# Patient Record
Sex: FEMALE | Race: BLACK OR AFRICAN AMERICAN
[De-identification: names, ages, dates, MRNs, and addresses within clinical notes are randomized per-mention and may not be internally consistent; named-entity substitution may affect disease eponyms.]

---

## 2018-10-12 ENCOUNTER — HOSPITAL ENCOUNTER (EMERGENCY)
Dept: HOSPITAL 72 - EMR | Age: 23
Discharge: HOME | End: 2018-10-12
Payer: COMMERCIAL

## 2018-10-12 VITALS — DIASTOLIC BLOOD PRESSURE: 72 MMHG | SYSTOLIC BLOOD PRESSURE: 111 MMHG

## 2018-10-12 VITALS — WEIGHT: 178 LBS | BODY MASS INDEX: 25.48 KG/M2 | HEIGHT: 70 IN

## 2018-10-12 DIAGNOSIS — M54.9: Primary | ICD-10-CM

## 2018-10-12 DIAGNOSIS — R10.9: ICD-10-CM

## 2018-10-12 LAB
ADD MANUAL DIFF: NO
ALBUMIN SERPL-MCNC: 2.9 G/DL (ref 3.4–5)
ALBUMIN/GLOB SERPL: 0.7 {RATIO} (ref 1–2.7)
ALP SERPL-CCNC: 90 U/L (ref 46–116)
ALT SERPL-CCNC: 29 U/L (ref 12–78)
ANION GAP SERPL CALC-SCNC: 10 MMOL/L (ref 5–15)
APPEARANCE UR: CLEAR
APTT PPP: (no result) S
AST SERPL-CCNC: 21 U/L (ref 15–37)
BASOPHILS NFR BLD AUTO: 1.1 % (ref 0–2)
BILIRUB SERPL-MCNC: 0.4 MG/DL (ref 0.2–1)
BUN SERPL-MCNC: 10 MG/DL (ref 7–18)
CALCIUM SERPL-MCNC: 9.6 MG/DL (ref 8.5–10.1)
CHLORIDE SERPL-SCNC: 105 MMOL/L (ref 98–107)
CO2 SERPL-SCNC: 22 MMOL/L (ref 21–32)
CREAT SERPL-MCNC: 0.6 MG/DL (ref 0.55–1.3)
EOSINOPHIL NFR BLD AUTO: 2.8 % (ref 0–3)
ERYTHROCYTE [DISTWIDTH] IN BLOOD BY AUTOMATED COUNT: 12.4 % (ref 11.6–14.8)
GLOBULIN SER-MCNC: 4 G/DL
GLUCOSE UR STRIP-MCNC: NEGATIVE MG/DL
HCT VFR BLD CALC: 34.1 % (ref 37–47)
HGB BLD-MCNC: 11.5 G/DL (ref 12–16)
KETONES UR QL STRIP: NEGATIVE
LEUKOCYTE ESTERASE UR QL STRIP: NEGATIVE
LYMPHOCYTES NFR BLD AUTO: 26.3 % (ref 20–45)
MCV RBC AUTO: 79 FL (ref 80–99)
MONOCYTES NFR BLD AUTO: 6 % (ref 1–10)
NEUTROPHILS NFR BLD AUTO: 63.8 % (ref 45–75)
NITRITE UR QL STRIP: NEGATIVE
PH UR STRIP: 7 [PH] (ref 4.5–8)
PLATELET # BLD: 239 K/UL (ref 150–450)
POTASSIUM SERPL-SCNC: 3.7 MMOL/L (ref 3.5–5.1)
PROT UR QL STRIP: NEGATIVE
RBC # BLD AUTO: 4.34 M/UL (ref 4.2–5.4)
SODIUM SERPL-SCNC: 137 MMOL/L (ref 136–145)
SP GR UR STRIP: 1.01 (ref 1–1.03)
UROBILINOGEN UR-MCNC: NORMAL MG/DL (ref 0–1)
WBC # BLD AUTO: 6.7 K/UL (ref 4.8–10.8)

## 2018-10-12 PROCEDURE — 99283 EMERGENCY DEPT VISIT LOW MDM: CPT

## 2018-10-12 PROCEDURE — 85025 COMPLETE CBC W/AUTO DIFF WBC: CPT

## 2018-10-12 PROCEDURE — 80053 COMPREHEN METABOLIC PANEL: CPT

## 2018-10-12 PROCEDURE — 83690 ASSAY OF LIPASE: CPT

## 2018-10-12 PROCEDURE — 36415 COLL VENOUS BLD VENIPUNCTURE: CPT

## 2018-10-12 PROCEDURE — 81003 URINALYSIS AUTO W/O SCOPE: CPT

## 2018-10-12 NOTE — EMERGENCY ROOM REPORT
History of Present Illness


General


Chief Complaint:  Back Pain-No Injury


Source:  Patient





Present Illness


HPI


Patient present with complaints of right flank pain ongoing for the past 7 days


Patient reports that she saw her OB physician several days ago and was told 

that her test results would take up to a week as a discomfort persisted she 

presents to the ER denies any chest pain or shortness of breath denies any 

vomiting or diarrhea pain is a pressure and sharp pain


And eyes any lower abdominal pain or cramping denies any vaginal bleeding or 

spotting denies any dysuria


Patient felt that she might be dehydrated and was drinking water and taking 

cranberry juice





Along with Tylenol which was minimally improving her symptoms


Allergies:  


Coded Allergies:  


     No Known Allergies (Unverified , 10/12/18)





Patient History


Past Medical History:  see triage record


Pertinent Family History:  none


Pregnant Now:  Yes - 25 weeks


Reviewed Nursing Documentation:  PMH: Agreed; PSxH: Agreed





Nursing Documentation-PMH


Past Medical History:  No Stated History





Review of Systems


All Other Systems:  negative except mentioned in HPI





Physical Exam





Vital Signs








  Date Time  Temp Pulse Resp B/P (MAP) Pulse Ox O2 Delivery O2 Flow Rate FiO2


 


10/12/18 17:21 98.3 95 18 111/68 96 Room Air  





 98.2       








Sp02 EP Interpretation:  reviewed, normal


General Appearance:  well appearing, no apparent distress


Head:  normocephalic, atraumatic


Eyes:  bilateral eye PERRL, bilateral eye EOMI


ENT:  hearing grossly normal, normal pharynx


Neck:  supple


Respiratory:  lungs clear, no respiratory distress, no retraction, no accessory 

muscle use


Cardiovascular #1:  regular rate, rhythm, no edema


Gastrointestinal:  non tender - Subjectively points to the right flank area for 

discomfort, some upper quadrant pain as well,  abdomen is palpable


Musculoskeletal:  normal inspection


Neurologic:  alert, oriented x3


Skin:  normal color, no rash





Medical Decision Making


Diagnostic Impression:  


 Primary Impression:  


 Back pain


 Additional Impression:  


 Flank pain


ER Course


Multiple differentials entertained


Patient does point mainly to the right mid back area also some flank region





Extensive blood work is initiated urine sample is clear


Making pyelonephritis less likely





Emergency room bedside ultrasound is obtained with appropriate visualization of 

the fetal heartbeat





Patient reports that the baby has been moving denies any abdominal cramping or 

pain denies any contraction type discomfort





Liver function tests all within normal limits as well I did not feel patient 

met any other emergency criteria for imaging and she is stable for close 

outpatient follow-up with her specialist





Labs








Test


  10/12/18


17:46 10/12/18


17:55


 


Urine Color Pale yellow  


 


Urine Appearance Clear  


 


Urine pH 7 (4.5-8.0)  


 


Urine Specific Gravity


  1.015


(1.005-1.035) 


 


 


Urine Protein


  Negative


(NEGATIVE) 


 


 


Urine Glucose (UA)


  Negative


(NEGATIVE) 


 


 


Urine Ketones


  Negative


(NEGATIVE) 


 


 


Urine Blood


  Negative


(NEGATIVE) 


 


 


Urine Nitrite


  Negative


(NEGATIVE) 


 


 


Urine Bilirubin


  Negative


(NEGATIVE) 


 


 


Urine Urobilinogen


  Normal MG/DL


(0.0-1.0) 


 


 


Urine Leukocyte Esterase


  Negative


(NEGATIVE) 


 


 


White Blood Count


  


  6.7 K/UL


(4.8-10.8)


 


Red Blood Count


  


  4.34 M/UL


(4.20-5.40)


 


Hemoglobin


  


  11.5 G/DL


(12.0-16.0)


 


Hematocrit


  


  34.1 %


(37.0-47.0)


 


Mean Corpuscular Volume  79 FL (80-99) 


 


Mean Corpuscular Hemoglobin


  


  26.5 PG


(27.0-31.0)


 


Mean Corpuscular Hemoglobin


Concent 


  33.7 G/DL


(32.0-36.0)


 


Red Cell Distribution Width


  


  12.4 %


(11.6-14.8)


 


Platelet Count


  


  239 K/UL


(150-450)


 


Mean Platelet Volume


  


  8.4 FL


(6.5-10.1)


 


Neutrophils (%) (Auto)


  


  63.8 %


(45.0-75.0)


 


Lymphocytes (%) (Auto)


  


  26.3 %


(20.0-45.0)


 


Monocytes (%) (Auto)


  


  6.0 %


(1.0-10.0)


 


Eosinophils (%) (Auto)


  


  2.8 %


(0.0-3.0)


 


Basophils (%) (Auto)


  


  1.1 %


(0.0-2.0)


 


Sodium Level


  


  137 MMOL/L


(136-145)


 


Potassium Level


  


  3.7 MMOL/L


(3.5-5.1)


 


Chloride Level


  


  105 MMOL/L


()


 


Carbon Dioxide Level


  


  22 MMOL/L


(21-32)


 


Anion Gap


  


  10 mmol/L


(5-15)


 


Blood Urea Nitrogen


  


  10 mg/dL


(7-18)


 


Creatinine


  


  0.6 MG/DL


(0.55-1.30)


 


Estimat Glomerular Filtration


Rate 


  > 60 mL/min


(>60)


 


Glucose Level


  


  87 MG/DL


()


 


Calcium Level


  


  9.6 MG/DL


(8.5-10.1)


 


Total Bilirubin


  


  0.4 MG/DL


(0.2-1.0)


 


Aspartate Amino Transf


(AST/SGOT) 


  21 U/L (15-37) 


 


 


Alanine Aminotransferase


(ALT/SGPT) 


  29 U/L (12-78) 


 


 


Alkaline Phosphatase


  


  90 U/L


()


 


Total Protein


  


  6.9 G/DL


(6.4-8.2)


 


Albumin


  


  2.9 G/DL


(3.4-5.0)


 


Globulin  4.0 g/dL 


 


Albumin/Globulin Ratio  0.7 (1.0-2.7) 


 


Lipase


  


  136 U/L


()











Last Vital Signs








  Date Time  Temp Pulse Resp B/P (MAP) Pulse Ox O2 Delivery O2 Flow Rate FiO2


 


10/12/18 17:21 98.3 95 18 111/68 96 Room Air  





 98.2       








Status:  improved


Disposition:  HOME, SELF-CARE


Condition:  Improved





Additional Instructions:  


Patient is provided with the discharge instructions notified to follow up with 

primary doctor in the next 2-3 days otherwise return to the er with any 

worsening symptoms.


Please note that this report is being documented using DRAGON technology.  This 

can lead to erroneous entry secondary to incorrect interpretation by the 

dictating instrument.











Conner Rodriguez DO Oct 12, 2018 17:50